# Patient Record
Sex: FEMALE | Race: WHITE | NOT HISPANIC OR LATINO | Employment: UNEMPLOYED | URBAN - NONMETROPOLITAN AREA
[De-identification: names, ages, dates, MRNs, and addresses within clinical notes are randomized per-mention and may not be internally consistent; named-entity substitution may affect disease eponyms.]

---
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
## 2020-05-04 ENCOUNTER — OFFICE VISIT (OUTPATIENT)
Dept: FAMILY MEDICINE | Facility: OTHER | Age: 20
End: 2020-05-04
Attending: PHYSICIAN ASSISTANT
Payer: COMMERCIAL

## 2020-05-04 VITALS
RESPIRATION RATE: 16 BRPM | BODY MASS INDEX: 41.3 KG/M2 | WEIGHT: 224.4 LBS | TEMPERATURE: 98.8 F | HEART RATE: 60 BPM | SYSTOLIC BLOOD PRESSURE: 108 MMHG | DIASTOLIC BLOOD PRESSURE: 60 MMHG | HEIGHT: 62 IN

## 2020-05-04 DIAGNOSIS — H93.11 TINNITUS, RIGHT: ICD-10-CM

## 2020-05-04 PROBLEM — R56.9 CONVULSIONS (H): Status: RESOLVED | Noted: 2018-02-24 | Resolved: 2020-05-04

## 2020-05-04 PROBLEM — E66.9 OBESITY: Status: ACTIVE | Noted: 2020-05-04

## 2020-05-04 PROCEDURE — 99213 OFFICE O/P EST LOW 20 MIN: CPT | Performed by: PHYSICIAN ASSISTANT

## 2020-05-04 ASSESSMENT — MIFFLIN-ST. JEOR: SCORE: 1746.12

## 2020-05-04 ASSESSMENT — PAIN SCALES - GENERAL: PAINLEVEL: NO PAIN (1)

## 2020-05-04 NOTE — PROGRESS NOTES
SUBJECTIVE:  Ritika Daniel is a 19 year old female with a history of obesity BMI 41.04 is here for complaints of a whooshing sound in her right ear since this past November.  Patient denies any trauma, foreign objects or loud noises when symptoms started.  Symptoms are made worse by leaning over and are made better when she presses on the space near the mastoid process.  No history of hypertension.  She denies dizziness, vertigo, facial numbness, pain of the ear or jaw, redness or swelling of the mastoid process or the ear.  No bleeding or discharge from the ear. No headache, vision changes, jaw pain, problems with chewing, swallowing, or breathing.   She denies fever chills or night sweats.  She has not tried any interventions to make her symptoms go away.  Patient states her mom looked in her ear with an otoscope recently and did not find any abnormalities.  Patient has a history of lockjaw but has not had any episodes since she was very young.      Patient Active Problem List    Diagnosis Date Noted     Obesity 2020     Priority: Medium     Tinnitus, right 2020     Priority: Medium       Past Medical History:   Diagnosis Date     Convulsions of      Mom states did see specialist, but no disorder was discovered.     Single live birth     BW 7 lb. 1 oz.,  APGAR 9/9; breast fed       History reviewed. No pertinent surgical history.    No current outpatient medications on file.       Allergies:  No Known Allergies    History reviewed. No pertinent family history.    Social History     Tobacco Use     Smoking status: Passive Smoke Exposure - Never Smoker     Smokeless tobacco: Never Used   Substance Use Topics     Alcohol use: No     Alcohol/week: 0.0 standard drinks     Drug use: Unknown     Types: Other     Comment: Drug use: No       ROS:    As above otherwise ROS is unremarkable.      OBJECTIVE:  /60 (BP Location: Right arm, Patient Position: Sitting, Cuff Size: Adult Large)   Pulse 60    "Temp 98.8  F (37.1  C) (Tympanic)   Resp 16   Ht 1.575 m (5' 2\")   Wt 101.8 kg (224 lb 6.4 oz)   LMP 05/03/2020 (Exact Date)   Breastfeeding No   BMI 41.04 kg/m      EXAM:  General Appearance: Pleasant, alert, appropriate appearance for age. No acute distress  Head: Normal. Normocephalic, atraumatic.  Eyes: PERRL, EOMI  Ears: Normal TM's bilaterally. Normal auditory canals and external ears.   OroPharynx: Dental hygiene adequate. Normal buccal mucosa. Normal pharynx.  Neck: Supple, no masses or nodes, no lymphadenopathy.  No thyromegaly.  Lungs: Normal chest wall and respirations. Clear to auscultation, no wheezes or crackles.  Cardiovascular: Regular rate and rhythm. S1, S2, no murmurs.  Skin: no concerning or new rashes.  Neurologic Exam: CN 2-12 grossly intact.  Normal gait.  Symmetric DTRs, No focal motor or sensory deficits. No tremor.  Psychiatric Exam: Alert and oriented, appropriate affect.    ASSESSEMENT AND PLAN:    1. Tinnitus, right    -No MRI at this point. Did not find significant features suggesting Meniere disease or Acoustic neuroma (schwannoma). No hypertension.   -Recommend she continue monitoring symptoms and report any changes to our clinic as soon as possible.  -Sent activation for My Chart to supplement future communication.       EMA Donahue  Family Medicine  [facility]     This document was prepared using voice generated software.  While every attempt was made for accuracy, grammatical errors may exist.    "

## 2020-05-04 NOTE — NURSING NOTE
"Patient in clinic for R ear problem x 5 months with ability to hear affected. Patient is struggling with it , as it is not getting better. Patient states, \"feels like wind is in my ear.\" Relieved by pushing under/behind earlobe.     Yovana Lui LPN LPN....................  5/4/2020   11:12 AM    Chief Complaint   Patient presents with     Ear Problem     right ear       Medication Reconciliation: complete    Yovana Lui LPN    "

## 2020-12-27 ENCOUNTER — HEALTH MAINTENANCE LETTER (OUTPATIENT)
Age: 20
End: 2020-12-27

## 2021-01-11 LAB
C TRACH DNA SPEC QL PROBE+SIG AMP: NEGATIVE
HBA1C MFR BLD: 5.2 % (ref 4.8–5.6)
HEP C HIM: NORMAL
HIV 1&2 EXT: NORMAL
N GONORRHOEA DNA SPEC QL PROBE+SIG AMP: NEGATIVE
SPECIMEN DESCRIP: NORMAL
SPECIMEN DESCRIPTION: NORMAL

## 2021-01-16 LAB
C TRACH DNA SPEC QL PROBE+SIG AMP: NEGATIVE
N GONORRHOEA DNA SPEC QL PROBE+SIG AMP: NEGATIVE
SPECIMEN DESCRIP: NORMAL
SPECIMEN DESCRIPTION: NORMAL

## 2021-02-18 ENCOUNTER — TRANSFERRED RECORDS (OUTPATIENT)
Dept: HEALTH INFORMATION MANAGEMENT | Facility: OTHER | Age: 21
End: 2021-02-18

## 2021-02-23 ENCOUNTER — OFFICE VISIT (OUTPATIENT)
Dept: OBGYN | Facility: OTHER | Age: 21
End: 2021-02-23
Attending: OBSTETRICS & GYNECOLOGY
Payer: COMMERCIAL

## 2021-02-23 VITALS
BODY MASS INDEX: 44.01 KG/M2 | WEIGHT: 240.6 LBS | OXYGEN SATURATION: 98 % | HEART RATE: 75 BPM | SYSTOLIC BLOOD PRESSURE: 130 MMHG | DIASTOLIC BLOOD PRESSURE: 82 MMHG

## 2021-02-23 DIAGNOSIS — O03.9 MISCARRIAGE: Primary | ICD-10-CM

## 2021-02-23 PROBLEM — O26.90: Status: ACTIVE | Noted: 2021-02-18

## 2021-02-23 PROBLEM — Z34.91 PRENATAL CARE, FIRST TRIMESTER: Status: ACTIVE | Noted: 2021-01-11

## 2021-02-23 PROBLEM — R03.0 ELEVATED BLOOD PRESSURE READING WITHOUT DIAGNOSIS OF HYPERTENSION: Status: ACTIVE | Noted: 2021-01-16

## 2021-02-23 PROBLEM — Z00.00 ENCOUNTER FOR GENERAL ADULT MEDICAL EXAMINATION WITHOUT ABNORMAL FINDINGS: Status: ACTIVE | Noted: 2021-01-11

## 2021-02-23 PROBLEM — E66.01 MORBID OBESITY (H): Status: ACTIVE | Noted: 2021-01-11

## 2021-02-23 PROBLEM — Z33.1 PREGNANT STATE, INCIDENTAL: Status: ACTIVE | Noted: 2020-12-30

## 2021-02-23 PROBLEM — N89.8 VAGINAL DISCHARGE: Status: ACTIVE | Noted: 2021-01-13

## 2021-02-23 LAB
LABORATORY COMMENT REPORT: NORMAL
SARS-COV-2 RNA RESP QL NAA+PROBE: NEGATIVE
SARS-COV-2 RNA RESP QL NAA+PROBE: NORMAL
SPECIMEN SOURCE: NORMAL
SPECIMEN SOURCE: NORMAL

## 2021-02-23 PROCEDURE — 99204 OFFICE O/P NEW MOD 45 MIN: CPT | Performed by: OBSTETRICS & GYNECOLOGY

## 2021-02-23 PROCEDURE — U0005 INFEC AGEN DETEC AMPLI PROBE: HCPCS | Mod: ZL | Performed by: OBSTETRICS & GYNECOLOGY

## 2021-02-23 PROCEDURE — U0003 INFECTIOUS AGENT DETECTION BY NUCLEIC ACID (DNA OR RNA); SEVERE ACUTE RESPIRATORY SYNDROME CORONAVIRUS 2 (SARS-COV-2) (CORONAVIRUS DISEASE [COVID-19]), AMPLIFIED PROBE TECHNIQUE, MAKING USE OF HIGH THROUGHPUT TECHNOLOGIES AS DESCRIBED BY CMS-2020-01-R: HCPCS | Mod: ZL | Performed by: OBSTETRICS & GYNECOLOGY

## 2021-02-23 PROCEDURE — C9803 HOPD COVID-19 SPEC COLLECT: HCPCS

## 2021-02-23 RX ORDER — DOXYCYCLINE 100 MG/1
100 CAPSULE ORAL ONCE
Status: CANCELLED | OUTPATIENT
Start: 2021-02-23 | End: 2021-02-23

## 2021-02-23 ASSESSMENT — PAIN SCALES - GENERAL: PAINLEVEL: NO PAIN (0)

## 2021-02-23 NOTE — H&P (VIEW-ONLY)
Gynecology Visit- Preop H&P    CC: missed     HPI:    Ritika Daniel is a 20 year old G1 here for the above concern. She was seen at a clinic in [place], which is where she lives with her boyfriend. Came to [place] because this is where she is from and wanted to be closer to her mom for the procedure. They were monitoring HCG levels, which had been declining. She had an ultrasound that confirmed miscarriage, not sure how far along she was measuring. Was supposed to be 12 weeks by LMP. The last few days, she has been having light bleeding and cramping. Some clots but no tissue.  She had discussed all her options with her primary OB provider and decided she wants a D&C.     OBHx  OB History    Para Term  AB Living   1 0 0 0 0 0   SAB TAB Ectopic Multiple Live Births   0 0 0 0 0      # Outcome Date GA Lbr Jl/2nd Weight Sex Delivery Anes PTL Lv   1 Current                PMHx:   Past Medical History:   Diagnosis Date     Convulsions of      Mom states did see specialist, but no disorder was discovered.     Single live birth     BW 7 lb. 1 oz.,  APGAR 9/9; breast fed      PSHx:   Past Surgical History:   Procedure Laterality Date     NO HISTORY OF SURGERY        Meds:    No current outpatient medications on file.     No current facility-administered medications for this visit.      Allergies:   No Known Allergies    SocHx:   Social History     Tobacco Use     Smoking status: Passive Smoke Exposure - Never Smoker     Smokeless tobacco: Never Used   Substance Use Topics     Alcohol use: No     Alcohol/week: 0.0 standard drinks     Drug use: Unknown     Types: Other     Comment: Drug use: No     Lives with her boyfriend and his parents in [place]. Graduated last year, looking for a job.     FamHx:   Family History   Problem Relation Age of Onset     Diabetes Maternal Grandmother         ROS: 10-Point ROS negative except as noted in HPI    Physical Exam  /82 (BP Location: Right arm, Patient  Position: Sitting, Cuff Size: Adult Large)   Pulse 75   Wt 109.1 kg (240 lb 9.6 oz)   LMP 2020 (Exact Date)   SpO2 98%   BMI 44.01 kg/m    Gen: Well-appearing, NAD  Resp: CTAB  CV: RRR, no m/r/g  Psych: appropriate mood and affect    HC21: 64597  2/15/21: 52602  21: 9376    Progesterone  21: 6.3    Blood type: A+, antibody negative    Assessment/Plan  Ritika Daniel is a 20 year old G1 female here for missed . Labs available in Care Everywhere summary from new OB visit. However, ultrasound images or report not available. Clinic contacted and patient already signed IRINEO, will try to obtain today or else will get ultrasound tomorrow. Discussed R/B/A for suction D&C, including risk of bleeding, infection, injury to surrounding organs. Consent signed. Scheduled for 21. COVID testing today.     Total time spent 45 minutes     Marilynn Guzman MD  OB/GYN  2021 12:40 PM

## 2021-02-23 NOTE — PROGRESS NOTES
Gynecology Visit- Preop H&P    CC: missed     HPI:    Ritika Daniel is a 20 year old G1 here for the above concern. She was seen at a clinic in [place], which is where she lives with her boyfriend. Came to [place] because this is where she is from and wanted to be closer to her mom for the procedure. They were monitoring HCG levels, which had been declining. She had an ultrasound that confirmed miscarriage, not sure how far along she was measuring. Was supposed to be 12 weeks by LMP. The last few days, she has been having light bleeding and cramping. Some clots but no tissue.  She had discussed all her options with her primary OB provider and decided she wants a D&C.     OBHx  OB History    Para Term  AB Living   1 0 0 0 0 0   SAB TAB Ectopic Multiple Live Births   0 0 0 0 0      # Outcome Date GA Lbr Jl/2nd Weight Sex Delivery Anes PTL Lv   1 Current                PMHx:   Past Medical History:   Diagnosis Date     Convulsions of      Mom states did see specialist, but no disorder was discovered.     Single live birth     BW 7 lb. 1 oz.,  APGAR 9/9; breast fed      PSHx:   Past Surgical History:   Procedure Laterality Date     NO HISTORY OF SURGERY        Meds:    No current outpatient medications on file.     No current facility-administered medications for this visit.      Allergies:   No Known Allergies    SocHx:   Social History     Tobacco Use     Smoking status: Passive Smoke Exposure - Never Smoker     Smokeless tobacco: Never Used   Substance Use Topics     Alcohol use: No     Alcohol/week: 0.0 standard drinks     Drug use: Unknown     Types: Other     Comment: Drug use: No     Lives with her boyfriend and his parents in [place]. Graduated last year, looking for a job.     FamHx:   Family History   Problem Relation Age of Onset     Diabetes Maternal Grandmother         ROS: 10-Point ROS negative except as noted in HPI    Physical Exam  /82 (BP Location: Right arm, Patient  Position: Sitting, Cuff Size: Adult Large)   Pulse 75   Wt 109.1 kg (240 lb 9.6 oz)   LMP 2020 (Exact Date)   SpO2 98%   BMI 44.01 kg/m    Gen: Well-appearing, NAD  Resp: CTAB  CV: RRR, no m/r/g  Psych: appropriate mood and affect    HC21: 14539  2/15/21: 03115  21: 9376    Progesterone  21: 6.3    Blood type: A+, antibody negative    Assessment/Plan  Ritika Daniel is a 20 year old G1 female here for missed . Labs available in Care Everywhere summary from new OB visit. However, ultrasound images or report not available. Clinic contacted and patient already signed IRINEO, will try to obtain today or else will get ultrasound tomorrow. Discussed R/B/A for suction D&C, including risk of bleeding, infection, injury to surrounding organs. Consent signed. Scheduled for 21. COVID testing today.     Total time spent 45 minutes     Marilynn Guzman MD  OB/GYN  2021 12:40 PM

## 2021-02-23 NOTE — PROGRESS NOTES
"Date of Surgery: 2/25/2021  Type of Surgery: Suction D & C  Surgeon: Dr. Marilynn Guzman MD    Patient's consents were signed and appropriate appointments were scheduled by the [facility] . Patient was given surgical folder which includes pre-operative bathing instructions related to the two packets of Hibiclens surgical prep provided. Surgical forms were copied and kept for informative purposes. Originals were delivered to Day-surgery. Patient denies questions at this time.        STOP BANG    Fever/Chills or other infectious symptoms in past month? n  >10 pound weight loss in the past 2 months? n  Health Care Directive on file? n  History of blood transfusions? n  Td up to date? y  History of VRE/MRSA? n      Obstructive Sleep Apnea screening    Preoperative Evaluation: Obstructive Sleep Apnea screening    S: Snore -  Do you snore loudly? (louder than talking or loud enough to be heard through closed doors)n  T: Tired - Do you often feel tired, fatigued, or sleepy during the daytime?n  O: Observed - Has anyone ever observed you stop breathing during your sleep?n  P: Pressure - Do you have or are you being treated for high blood pressure?n  B: BMI - BMI greater than 35kg/m2?y  A: Age - Age over 50 years old?n  N: Neck - Neck circumference greater than 40 cm?n  G: Gender - Gender: Male?n    Total number of \"YES\" responses:  1    Scoring: Low risk of MARS 0-2  At Risk of MARS: >3 High Risk of MARS: 5-8      Total yes answers in MARS section:    Low risk 0-2  At risk 3-4  High risk 5-8    Romi Guzman RN............. 2/23/2021 12:32 PM   "

## 2021-02-24 ENCOUNTER — ANESTHESIA EVENT (OUTPATIENT)
Dept: SURGERY | Facility: OTHER | Age: 21
End: 2021-02-24
Payer: COMMERCIAL

## 2021-02-24 ENCOUNTER — HOSPITAL ENCOUNTER (EMERGENCY)
Facility: OTHER | Age: 21
Discharge: HOME | End: 2021-02-24
Attending: EMERGENCY MEDICINE | Admitting: EMERGENCY MEDICINE
Payer: COMMERCIAL

## 2021-02-24 ENCOUNTER — APPOINTMENT (OUTPATIENT)
Dept: ULTRASOUND IMAGING | Facility: OTHER | Age: 21
End: 2021-02-24
Attending: EMERGENCY MEDICINE
Payer: COMMERCIAL

## 2021-02-24 VITALS
HEART RATE: 82 BPM | TEMPERATURE: 98.2 F | SYSTOLIC BLOOD PRESSURE: 123 MMHG | BODY MASS INDEX: 44.16 KG/M2 | HEIGHT: 62 IN | WEIGHT: 240 LBS | DIASTOLIC BLOOD PRESSURE: 70 MMHG | RESPIRATION RATE: 18 BRPM | OXYGEN SATURATION: 98 %

## 2021-02-24 DIAGNOSIS — O02.1 MISSED ABORTION: ICD-10-CM

## 2021-02-24 LAB
ABO + RH BLD: NORMAL
ABO + RH BLD: NORMAL
BASOPHILS # BLD AUTO: 0 10E9/L (ref 0–0.2)
BASOPHILS NFR BLD AUTO: 0.2 %
BLD GP AB SCN SERPL QL: NORMAL
BLOOD BANK CMNT PATIENT-IMP: NORMAL
D DIMER PPP FEU-MCNC: 0.4 UG/ML FEU (ref 0–0.5)
DIFFERENTIAL METHOD BLD: ABNORMAL
EOSINOPHIL # BLD AUTO: 0.1 10E9/L (ref 0–0.7)
EOSINOPHIL NFR BLD AUTO: 0.9 %
ERYTHROCYTE [DISTWIDTH] IN BLOOD BY AUTOMATED COUNT: 14 % (ref 10–15)
HCT VFR BLD AUTO: 35.5 % (ref 35–47)
HGB BLD-MCNC: 11.9 G/DL (ref 11.7–15.7)
IMM GRANULOCYTES # BLD: 0 10E9/L (ref 0–0.4)
IMM GRANULOCYTES NFR BLD: 0.3 %
LYMPHOCYTES # BLD AUTO: 2.5 10E9/L (ref 0.8–5.3)
LYMPHOCYTES NFR BLD AUTO: 19.5 %
MCH RBC QN AUTO: 27 PG (ref 26.5–33)
MCHC RBC AUTO-ENTMCNC: 33.5 G/DL (ref 31.5–36.5)
MCV RBC AUTO: 81 FL (ref 78–100)
MONOCYTES # BLD AUTO: 0.8 10E9/L (ref 0–1.3)
MONOCYTES NFR BLD AUTO: 6.4 %
NEUTROPHILS # BLD AUTO: 9.1 10E9/L (ref 1.6–8.3)
NEUTROPHILS NFR BLD AUTO: 72.7 %
PLATELET # BLD AUTO: 384 10E9/L (ref 150–450)
RBC # BLD AUTO: 4.41 10E12/L (ref 3.8–5.2)
SPECIMEN EXP DATE BLD: NORMAL
WBC # BLD AUTO: 12.6 10E9/L (ref 4–11)

## 2021-02-24 PROCEDURE — 85379 FIBRIN DEGRADATION QUANT: CPT | Performed by: EMERGENCY MEDICINE

## 2021-02-24 PROCEDURE — 96374 THER/PROPH/DIAG INJ IV PUSH: CPT | Performed by: EMERGENCY MEDICINE

## 2021-02-24 PROCEDURE — 85025 COMPLETE CBC W/AUTO DIFF WBC: CPT | Performed by: EMERGENCY MEDICINE

## 2021-02-24 PROCEDURE — 96375 TX/PRO/DX INJ NEW DRUG ADDON: CPT | Performed by: EMERGENCY MEDICINE

## 2021-02-24 PROCEDURE — 99284 EMERGENCY DEPT VISIT MOD MDM: CPT | Mod: 25 | Performed by: EMERGENCY MEDICINE

## 2021-02-24 PROCEDURE — 86900 BLOOD TYPING SEROLOGIC ABO: CPT | Performed by: EMERGENCY MEDICINE

## 2021-02-24 PROCEDURE — 36415 COLL VENOUS BLD VENIPUNCTURE: CPT | Performed by: EMERGENCY MEDICINE

## 2021-02-24 PROCEDURE — 86850 RBC ANTIBODY SCREEN: CPT | Performed by: EMERGENCY MEDICINE

## 2021-02-24 PROCEDURE — 99283 EMERGENCY DEPT VISIT LOW MDM: CPT | Performed by: EMERGENCY MEDICINE

## 2021-02-24 PROCEDURE — 258N000003 HC RX IP 258 OP 636: Performed by: EMERGENCY MEDICINE

## 2021-02-24 PROCEDURE — 96361 HYDRATE IV INFUSION ADD-ON: CPT | Performed by: EMERGENCY MEDICINE

## 2021-02-24 PROCEDURE — 86901 BLOOD TYPING SEROLOGIC RH(D): CPT | Performed by: EMERGENCY MEDICINE

## 2021-02-24 PROCEDURE — 76830 TRANSVAGINAL US NON-OB: CPT

## 2021-02-24 PROCEDURE — 250N000011 HC RX IP 250 OP 636: Performed by: EMERGENCY MEDICINE

## 2021-02-24 RX ORDER — SODIUM CHLORIDE 9 MG/ML
INJECTION, SOLUTION INTRAVENOUS CONTINUOUS
Status: DISCONTINUED | OUTPATIENT
Start: 2021-02-24 | End: 2021-02-24 | Stop reason: HOSPADM

## 2021-02-24 RX ORDER — HYDROMORPHONE HYDROCHLORIDE 1 MG/ML
0.5 INJECTION, SOLUTION INTRAMUSCULAR; INTRAVENOUS; SUBCUTANEOUS ONCE
Status: COMPLETED | OUTPATIENT
Start: 2021-02-24 | End: 2021-02-24

## 2021-02-24 RX ORDER — KETOROLAC TROMETHAMINE 15 MG/ML
15 INJECTION, SOLUTION INTRAMUSCULAR; INTRAVENOUS ONCE
Status: COMPLETED | OUTPATIENT
Start: 2021-02-24 | End: 2021-02-24

## 2021-02-24 RX ADMIN — SODIUM CHLORIDE 1000 ML: 9 INJECTION, SOLUTION INTRAVENOUS at 19:40

## 2021-02-24 RX ADMIN — HYDROMORPHONE HYDROCHLORIDE 0.5 MG: 1 INJECTION, SOLUTION INTRAMUSCULAR; INTRAVENOUS; SUBCUTANEOUS at 19:38

## 2021-02-24 RX ADMIN — KETOROLAC TROMETHAMINE 15 MG: 15 INJECTION, SOLUTION INTRAMUSCULAR; INTRAVENOUS at 19:42

## 2021-02-24 ASSESSMENT — MIFFLIN-ST. JEOR: SCORE: 1811.88

## 2021-02-25 ENCOUNTER — ANESTHESIA (OUTPATIENT)
Dept: SURGERY | Facility: OTHER | Age: 21
End: 2021-02-25
Payer: COMMERCIAL

## 2021-02-25 ENCOUNTER — HOSPITAL ENCOUNTER (OUTPATIENT)
Facility: OTHER | Age: 21
Discharge: HOME | End: 2021-02-25
Attending: OBSTETRICS & GYNECOLOGY | Admitting: OBSTETRICS & GYNECOLOGY
Payer: COMMERCIAL

## 2021-02-25 ENCOUNTER — HOSPITAL ENCOUNTER (EMERGENCY)
Facility: OTHER | Age: 21
Discharge: HOME | End: 2021-02-25
Attending: EMERGENCY MEDICINE | Admitting: EMERGENCY MEDICINE
Payer: COMMERCIAL

## 2021-02-25 VITALS
HEIGHT: 62 IN | OXYGEN SATURATION: 98 % | SYSTOLIC BLOOD PRESSURE: 134 MMHG | WEIGHT: 240 LBS | DIASTOLIC BLOOD PRESSURE: 85 MMHG | BODY MASS INDEX: 44.16 KG/M2 | TEMPERATURE: 99.6 F | RESPIRATION RATE: 20 BRPM | HEART RATE: 99 BPM

## 2021-02-25 VITALS
HEART RATE: 76 BPM | WEIGHT: 240 LBS | SYSTOLIC BLOOD PRESSURE: 97 MMHG | OXYGEN SATURATION: 98 % | RESPIRATION RATE: 18 BRPM | DIASTOLIC BLOOD PRESSURE: 67 MMHG | HEIGHT: 62 IN | TEMPERATURE: 97.6 F | BODY MASS INDEX: 44.16 KG/M2

## 2021-02-25 DIAGNOSIS — O03.9 MISCARRIAGE: ICD-10-CM

## 2021-02-25 DIAGNOSIS — G89.18 ACUTE POST-OPERATIVE PAIN: ICD-10-CM

## 2021-02-25 LAB
ALBUMIN UR-MCNC: ABNORMAL MG/DL
ANION GAP SERPL CALCULATED.3IONS-SCNC: 10 MMOL/L (ref 3–14)
APPEARANCE UR: ABNORMAL
BASOPHILS # BLD AUTO: 0 10E9/L (ref 0–0.2)
BASOPHILS NFR BLD AUTO: 0.1 %
BILIRUB UR QL STRIP: ABNORMAL
BUN SERPL-MCNC: 9 MG/DL (ref 7–25)
CALCIUM SERPL-MCNC: 9.3 MG/DL (ref 8.6–10.3)
CHLORIDE SERPL-SCNC: 106 MMOL/L (ref 98–107)
CO2 SERPL-SCNC: 19 MMOL/L (ref 21–31)
COLOR UR AUTO: ABNORMAL
CREAT SERPL-MCNC: 0.58 MG/DL (ref 0.6–1.2)
DIFFERENTIAL METHOD BLD: ABNORMAL
EOSINOPHIL # BLD AUTO: 0 10E9/L (ref 0–0.7)
EOSINOPHIL NFR BLD AUTO: 0 %
ERYTHROCYTE [DISTWIDTH] IN BLOOD BY AUTOMATED COUNT: 13.9 % (ref 10–15)
GFR SERPL CREATININE-BSD FRML MDRD: >90 ML/MIN/{1.73_M2}
GLUCOSE SERPL-MCNC: 136 MG/DL (ref 70–105)
GLUCOSE UR STRIP-MCNC: ABNORMAL MG/DL
HCT VFR BLD AUTO: 33.6 % (ref 35–47)
HGB BLD-MCNC: 11.3 G/DL (ref 11.7–15.7)
HGB UR QL STRIP: ABNORMAL
IMM GRANULOCYTES # BLD: 0.1 10E9/L (ref 0–0.4)
IMM GRANULOCYTES NFR BLD: 0.6 %
KETONES UR STRIP-MCNC: ABNORMAL MG/DL
LACTATE BLD-SCNC: 1.5 MMOL/L (ref 0.7–2)
LEUKOCYTE ESTERASE UR QL STRIP: ABNORMAL
LYMPHOCYTES # BLD AUTO: 1.3 10E9/L (ref 0.8–5.3)
LYMPHOCYTES NFR BLD AUTO: 7.2 %
MCH RBC QN AUTO: 26.9 PG (ref 26.5–33)
MCHC RBC AUTO-ENTMCNC: 33.6 G/DL (ref 31.5–36.5)
MCV RBC AUTO: 80 FL (ref 78–100)
MONOCYTES # BLD AUTO: 0.8 10E9/L (ref 0–1.3)
MONOCYTES NFR BLD AUTO: 4.5 %
NEUTROPHILS # BLD AUTO: 15.3 10E9/L (ref 1.6–8.3)
NEUTROPHILS NFR BLD AUTO: 87.6 %
NITRATE UR QL: ABNORMAL
PH UR STRIP: ABNORMAL PH (ref 5–7)
PLATELET # BLD AUTO: 398 10E9/L (ref 150–450)
POTASSIUM SERPL-SCNC: 3.9 MMOL/L (ref 3.5–5.1)
RBC # BLD AUTO: 4.2 10E12/L (ref 3.8–5.2)
RBC #/AREA URNS AUTO: >182 /HPF (ref 0–2)
SODIUM SERPL-SCNC: 135 MMOL/L (ref 134–144)
SOURCE: ABNORMAL
SP GR UR STRIP: ABNORMAL (ref 1–1.03)
UROBILINOGEN UR STRIP-MCNC: ABNORMAL MG/DL (ref 0–2)
WBC # BLD AUTO: 17.4 10E9/L (ref 4–11)
WBC #/AREA URNS AUTO: >182 /HPF (ref 0–5)

## 2021-02-25 PROCEDURE — 88305 TISSUE EXAM BY PATHOLOGIST: CPT

## 2021-02-25 PROCEDURE — 59820 CARE OF MISCARRIAGE: CPT | Performed by: NURSE ANESTHETIST, CERTIFIED REGISTERED

## 2021-02-25 PROCEDURE — 83605 ASSAY OF LACTIC ACID: CPT | Performed by: EMERGENCY MEDICINE

## 2021-02-25 PROCEDURE — 360N000075 HC SURGERY LEVEL 2, PER MIN: Performed by: OBSTETRICS & GYNECOLOGY

## 2021-02-25 PROCEDURE — 250N000013 HC RX MED GY IP 250 OP 250 PS 637: Performed by: OBSTETRICS & GYNECOLOGY

## 2021-02-25 PROCEDURE — 99283 EMERGENCY DEPT VISIT LOW MDM: CPT | Performed by: EMERGENCY MEDICINE

## 2021-02-25 PROCEDURE — 250N000013 HC RX MED GY IP 250 OP 250 PS 637: Performed by: EMERGENCY MEDICINE

## 2021-02-25 PROCEDURE — 88182 CELL MARKER STUDY: CPT

## 2021-02-25 PROCEDURE — 250N000011 HC RX IP 250 OP 636: Performed by: NURSE ANESTHETIST, CERTIFIED REGISTERED

## 2021-02-25 PROCEDURE — 99284 EMERGENCY DEPT VISIT MOD MDM: CPT | Mod: 25 | Performed by: EMERGENCY MEDICINE

## 2021-02-25 PROCEDURE — 81001 URINALYSIS AUTO W/SCOPE: CPT | Performed by: EMERGENCY MEDICINE

## 2021-02-25 PROCEDURE — 250N000011 HC RX IP 250 OP 636: Performed by: OBSTETRICS & GYNECOLOGY

## 2021-02-25 PROCEDURE — 999N000141 HC STATISTIC PRE-PROCEDURE NURSING ASSESSMENT: Performed by: OBSTETRICS & GYNECOLOGY

## 2021-02-25 PROCEDURE — 258N000003 HC RX IP 258 OP 636: Performed by: NURSE ANESTHETIST, CERTIFIED REGISTERED

## 2021-02-25 PROCEDURE — 80048 BASIC METABOLIC PNL TOTAL CA: CPT | Performed by: EMERGENCY MEDICINE

## 2021-02-25 PROCEDURE — 272N000001 HC OR GENERAL SUPPLY STERILE: Performed by: OBSTETRICS & GYNECOLOGY

## 2021-02-25 PROCEDURE — 250N000009 HC RX 250: Performed by: OBSTETRICS & GYNECOLOGY

## 2021-02-25 PROCEDURE — 258N000003 HC RX IP 258 OP 636: Performed by: EMERGENCY MEDICINE

## 2021-02-25 PROCEDURE — 96375 TX/PRO/DX INJ NEW DRUG ADDON: CPT | Performed by: EMERGENCY MEDICINE

## 2021-02-25 PROCEDURE — 250N000011 HC RX IP 250 OP 636: Performed by: EMERGENCY MEDICINE

## 2021-02-25 PROCEDURE — 250N000009 HC RX 250: Performed by: NURSE ANESTHETIST, CERTIFIED REGISTERED

## 2021-02-25 PROCEDURE — 59820 CARE OF MISCARRIAGE: CPT | Performed by: OBSTETRICS & GYNECOLOGY

## 2021-02-25 PROCEDURE — 710N000012 HC RECOVERY PHASE 2, PER MINUTE: Performed by: OBSTETRICS & GYNECOLOGY

## 2021-02-25 PROCEDURE — 370N000017 HC ANESTHESIA TECHNICAL FEE, PER MIN: Performed by: OBSTETRICS & GYNECOLOGY

## 2021-02-25 PROCEDURE — 96374 THER/PROPH/DIAG INJ IV PUSH: CPT | Performed by: EMERGENCY MEDICINE

## 2021-02-25 PROCEDURE — 85025 COMPLETE CBC W/AUTO DIFF WBC: CPT | Performed by: EMERGENCY MEDICINE

## 2021-02-25 PROCEDURE — 88342 IMHCHEM/IMCYTCHM 1ST ANTB: CPT

## 2021-02-25 PROCEDURE — 87086 URINE CULTURE/COLONY COUNT: CPT | Performed by: EMERGENCY MEDICINE

## 2021-02-25 RX ORDER — PROPOFOL 10 MG/ML
INJECTION, EMULSION INTRAVENOUS PRN
Status: DISCONTINUED | OUTPATIENT
Start: 2021-02-25 | End: 2021-02-25

## 2021-02-25 RX ORDER — FENTANYL CITRATE 50 UG/ML
25-50 INJECTION, SOLUTION INTRAMUSCULAR; INTRAVENOUS
Status: DISCONTINUED | OUTPATIENT
Start: 2021-02-25 | End: 2021-02-25 | Stop reason: HOSPADM

## 2021-02-25 RX ORDER — IBUPROFEN 200 MG
800 TABLET ORAL ONCE
Status: CANCELLED | OUTPATIENT
Start: 2021-02-25 | End: 2021-02-25

## 2021-02-25 RX ORDER — NALOXONE HYDROCHLORIDE 0.4 MG/ML
0.2 INJECTION, SOLUTION INTRAMUSCULAR; INTRAVENOUS; SUBCUTANEOUS
Status: DISCONTINUED | OUTPATIENT
Start: 2021-02-25 | End: 2021-02-25 | Stop reason: HOSPADM

## 2021-02-25 RX ORDER — NALOXONE HYDROCHLORIDE 0.4 MG/ML
0.4 INJECTION, SOLUTION INTRAMUSCULAR; INTRAVENOUS; SUBCUTANEOUS
Status: DISCONTINUED | OUTPATIENT
Start: 2021-02-25 | End: 2021-02-25 | Stop reason: HOSPADM

## 2021-02-25 RX ORDER — LIDOCAINE HYDROCHLORIDE 20 MG/ML
INJECTION, SOLUTION INFILTRATION; PERINEURAL PRN
Status: DISCONTINUED | OUTPATIENT
Start: 2021-02-25 | End: 2021-02-25

## 2021-02-25 RX ORDER — HYDROMORPHONE HYDROCHLORIDE 1 MG/ML
.3-.5 INJECTION, SOLUTION INTRAMUSCULAR; INTRAVENOUS; SUBCUTANEOUS EVERY 10 MIN PRN
Status: DISCONTINUED | OUTPATIENT
Start: 2021-02-25 | End: 2021-02-25 | Stop reason: HOSPADM

## 2021-02-25 RX ORDER — KETOROLAC TROMETHAMINE 30 MG/ML
INJECTION, SOLUTION INTRAMUSCULAR; INTRAVENOUS PRN
Status: DISCONTINUED | OUTPATIENT
Start: 2021-02-25 | End: 2021-02-25

## 2021-02-25 RX ORDER — MEPERIDINE HYDROCHLORIDE 50 MG/ML
12.5 INJECTION INTRAMUSCULAR; INTRAVENOUS; SUBCUTANEOUS
Status: DISCONTINUED | OUTPATIENT
Start: 2021-02-25 | End: 2021-02-25 | Stop reason: HOSPADM

## 2021-02-25 RX ORDER — SODIUM CHLORIDE, SODIUM LACTATE, POTASSIUM CHLORIDE, CALCIUM CHLORIDE 600; 310; 30; 20 MG/100ML; MG/100ML; MG/100ML; MG/100ML
INJECTION, SOLUTION INTRAVENOUS CONTINUOUS
Status: DISCONTINUED | OUTPATIENT
Start: 2021-02-25 | End: 2021-02-25 | Stop reason: HOSPADM

## 2021-02-25 RX ORDER — ACETAMINOPHEN 325 MG/1
975 TABLET ORAL ONCE
Status: CANCELLED | OUTPATIENT
Start: 2021-02-25 | End: 2021-02-25

## 2021-02-25 RX ORDER — DOXYCYCLINE 100 MG/1
100 CAPSULE ORAL ONCE
Status: COMPLETED | OUTPATIENT
Start: 2021-02-25 | End: 2021-02-25

## 2021-02-25 RX ORDER — SODIUM CHLORIDE 9 MG/ML
INJECTION, SOLUTION INTRAVENOUS CONTINUOUS
Status: DISCONTINUED | OUTPATIENT
Start: 2021-02-25 | End: 2021-02-26 | Stop reason: HOSPADM

## 2021-02-25 RX ORDER — DEXAMETHASONE SODIUM PHOSPHATE 4 MG/ML
INJECTION, SOLUTION INTRA-ARTICULAR; INTRALESIONAL; INTRAMUSCULAR; INTRAVENOUS; SOFT TISSUE PRN
Status: DISCONTINUED | OUTPATIENT
Start: 2021-02-25 | End: 2021-02-25

## 2021-02-25 RX ORDER — IBUPROFEN 200 MG
600 TABLET ORAL EVERY 8 HOURS PRN
Qty: 60 TABLET | Refills: 0 | COMMUNITY
Start: 2021-02-25

## 2021-02-25 RX ORDER — KETOROLAC TROMETHAMINE 15 MG/ML
15 INJECTION, SOLUTION INTRAMUSCULAR; INTRAVENOUS ONCE
Status: COMPLETED | OUTPATIENT
Start: 2021-02-25 | End: 2021-02-25

## 2021-02-25 RX ORDER — ONDANSETRON 2 MG/ML
4 INJECTION INTRAMUSCULAR; INTRAVENOUS EVERY 30 MIN PRN
Status: DISCONTINUED | OUTPATIENT
Start: 2021-02-25 | End: 2021-02-25 | Stop reason: HOSPADM

## 2021-02-25 RX ORDER — KETAMINE HYDROCHLORIDE 10 MG/ML
INJECTION INTRAMUSCULAR; INTRAVENOUS PRN
Status: DISCONTINUED | OUTPATIENT
Start: 2021-02-25 | End: 2021-02-25

## 2021-02-25 RX ORDER — ONDANSETRON 2 MG/ML
4 INJECTION INTRAMUSCULAR; INTRAVENOUS EVERY 30 MIN PRN
Status: DISCONTINUED | OUTPATIENT
Start: 2021-02-25 | End: 2021-02-26 | Stop reason: HOSPADM

## 2021-02-25 RX ORDER — PROPOFOL 10 MG/ML
INJECTION, EMULSION INTRAVENOUS CONTINUOUS PRN
Status: DISCONTINUED | OUTPATIENT
Start: 2021-02-25 | End: 2021-02-25

## 2021-02-25 RX ORDER — OXYCODONE HYDROCHLORIDE 5 MG/1
5 CAPSULE ORAL EVERY 6 HOURS PRN
Qty: 4 CAPSULE | Refills: 0 | Status: SHIPPED | OUTPATIENT
Start: 2021-02-25

## 2021-02-25 RX ORDER — BUPIVACAINE HYDROCHLORIDE 2.5 MG/ML
INJECTION, SOLUTION INFILTRATION; PERINEURAL PRN
Status: DISCONTINUED | OUTPATIENT
Start: 2021-02-25 | End: 2021-02-25 | Stop reason: HOSPADM

## 2021-02-25 RX ORDER — ONDANSETRON 2 MG/ML
INJECTION INTRAMUSCULAR; INTRAVENOUS PRN
Status: DISCONTINUED | OUTPATIENT
Start: 2021-02-25 | End: 2021-02-25

## 2021-02-25 RX ORDER — HYDROMORPHONE HYDROCHLORIDE 1 MG/ML
0.5 INJECTION, SOLUTION INTRAMUSCULAR; INTRAVENOUS; SUBCUTANEOUS
Status: DISCONTINUED | OUTPATIENT
Start: 2021-02-25 | End: 2021-02-26 | Stop reason: HOSPADM

## 2021-02-25 RX ORDER — OXYCODONE HYDROCHLORIDE 5 MG/1
5 TABLET ORAL ONCE
Status: COMPLETED | OUTPATIENT
Start: 2021-02-25 | End: 2021-02-25

## 2021-02-25 RX ORDER — ONDANSETRON 4 MG/1
4 TABLET, ORALLY DISINTEGRATING ORAL EVERY 30 MIN PRN
Status: DISCONTINUED | OUTPATIENT
Start: 2021-02-25 | End: 2021-02-25 | Stop reason: HOSPADM

## 2021-02-25 RX ADMIN — HYDROMORPHONE HYDROCHLORIDE 0.5 MG: 1 INJECTION, SOLUTION INTRAMUSCULAR; INTRAVENOUS; SUBCUTANEOUS at 20:26

## 2021-02-25 RX ADMIN — LIDOCAINE HYDROCHLORIDE 40 MG: 20 INJECTION, SOLUTION INFILTRATION; PERINEURAL at 09:09

## 2021-02-25 RX ADMIN — KETOROLAC TROMETHAMINE 30 MG: 30 INJECTION, SOLUTION INTRAMUSCULAR at 09:12

## 2021-02-25 RX ADMIN — DOXYCYCLINE 100 MG: 100 CAPSULE ORAL at 08:25

## 2021-02-25 RX ADMIN — SODIUM CHLORIDE: 9 INJECTION, SOLUTION INTRAVENOUS at 20:32

## 2021-02-25 RX ADMIN — OXYCODONE HYDROCHLORIDE 5 MG: 5 TABLET ORAL at 22:08

## 2021-02-25 RX ADMIN — MIDAZOLAM 2 MG: 1 INJECTION INTRAMUSCULAR; INTRAVENOUS at 09:09

## 2021-02-25 RX ADMIN — DEXAMETHASONE SODIUM PHOSPHATE 4 MG: 4 INJECTION, SOLUTION INTRA-ARTICULAR; INTRALESIONAL; INTRAMUSCULAR; INTRAVENOUS; SOFT TISSUE at 09:09

## 2021-02-25 RX ADMIN — PROPOFOL 120 MCG/KG/MIN: 10 INJECTION, EMULSION INTRAVENOUS at 09:09

## 2021-02-25 RX ADMIN — ONDANSETRON 4 MG: 2 INJECTION INTRAMUSCULAR; INTRAVENOUS at 09:09

## 2021-02-25 RX ADMIN — SODIUM CHLORIDE, POTASSIUM CHLORIDE, SODIUM LACTATE AND CALCIUM CHLORIDE: 600; 310; 30; 20 INJECTION, SOLUTION INTRAVENOUS at 08:25

## 2021-02-25 RX ADMIN — KETOROLAC TROMETHAMINE 15 MG: 15 INJECTION, SOLUTION INTRAMUSCULAR; INTRAVENOUS at 20:29

## 2021-02-25 RX ADMIN — ONDANSETRON 4 MG: 2 INJECTION INTRAMUSCULAR; INTRAVENOUS at 20:32

## 2021-02-25 RX ADMIN — Medication 20 MG: at 09:09

## 2021-02-25 RX ADMIN — HYDROMORPHONE HYDROCHLORIDE 0.5 MG: 1 INJECTION, SOLUTION INTRAMUSCULAR; INTRAVENOUS; SUBCUTANEOUS at 20:53

## 2021-02-25 RX ADMIN — PROPOFOL 50 MG: 10 INJECTION, EMULSION INTRAVENOUS at 09:09

## 2021-02-25 ASSESSMENT — MIFFLIN-ST. JEOR
SCORE: 1811.88
SCORE: 1811.88

## 2021-02-25 ASSESSMENT — LIFESTYLE VARIABLES: TOBACCO_USE: 1

## 2021-02-25 NOTE — OP NOTE
[facility] Gynecology Operative Note    Pre-operative diagnosis: Blighted ovum   Post-operative diagnosis: Same   Procedure: Suction dilation and curettage   Surgeon: Marilynn Guzman MD    Anesthesia: MAC (monitored anesthesia care)   Estimated blood loss: 5 mL   Complications: None     Findings:  Normal sized, anteverted uterus. Appropriate amount products of conception    Indications: Ritika Daniel is a 20 year old  who presented with a blighted ovum, desired D&C.     Procedure: The patient was taken to the operating room where she underwent MAC anesthesia without difficulty. She was placed in the dorsal lithotomy position. An examination was done and it was noted that her uterus was mobile, anteverted, normal size. She was prepped and draped in the usual sterile fashion. A sterile speculum was inserted into the vagina.      A single tooth tenaculum was placed on that location. 10cc of 0.25% marcaine was injected at 4 o'clock and 8 o'clock to create a paracervical block.     The cervix was serially dilated to 7mm using Freda dilators. A 7mm suction curette was advanced gently to the uterine fundus. The suction device was activated and the curette rotated to clear the uterus of products of conception. Two more passes of the suction curettage were performed. A sharp curettage was performed with a gritty texture noted in the uterine cavity. There was minimal bleeding noted and the tenaculum removed with good hemostasis noted. The patient tolerated the procedure well and was taken to the recovery area in stable condition.       Marilynn Guzman MD  2021

## 2021-02-25 NOTE — ED PROVIDER NOTES
[facility]  Emergency Department Visit Note    Vaginal Bleeding (Known miscarrage)      History of Present Illness     HPI:  Ritika Daniel is a 20 year old female G1 who is scheduled for a D&C tomorrow for a missed  at 12 weeks gestation. She is presenting tonight with increased vaginal bleeding and pain. She has passed many large clots ad one picture does appear to be POC. This started 4 hours ago and is described as severe.  There is no nausea, vomiting, other vaginal discharge, dysuria, light headedness.    Medications:  Prior to Admission medications    Medication Sig Last Dose Taking? Auth Provider   Prenatal Vit-Fe Fumarate-FA (MYNATAL PLUS) TABS    Reported, Patient       Allergies:  No Known Allergies    Problem List:  Patient Active Problem List   Diagnosis     Obesity     Tinnitus, right     Miscarriage     Elevated blood pressure reading without diagnosis of hypertension     Abnormal pregnancy, unspecified trimester     Pregnant state, incidental     Prenatal care, first trimester     Vaginal discharge     Morbid obesity (H)     Encounter for general adult medical examination without abnormal findings       Past Medical History:  Past Medical History:   Diagnosis Date     Convulsions of      Mom states did see specialist, but no disorder was discovered.     Single live birth     BW 7 lb. 1 oz.,  APGAR 9/9; breast fed       Past Surgical History:  Past Surgical History:   Procedure Laterality Date     NO HISTORY OF SURGERY         Social History:  Social History     Tobacco Use     Smoking status: Passive Smoke Exposure - Never Smoker     Smokeless tobacco: Never Used   Substance Use Topics     Alcohol use: No     Alcohol/week: 0.0 standard drinks     Drug use: Unknown     Types: Other     Comment: Drug use: No       Review of Systems:  10 point review of systems obtained and pertinent positive and negative findings noted in HPI. Review of systems otherwise  "negative.      Physical Exam     Vital signs: /70   Pulse 82   Temp 98.2  F (36.8  C) (Tympanic)   Resp 18   Ht 1.575 m (5' 2\")   Wt 108.9 kg (240 lb)   LMP 2020   SpO2 98%   BMI 43.90 kg/m      Physical Exam:    General: awake and alert, comfortable but tearful  HEENT: atraumatic, no scleral injection, no nasal discharge, neck supple  Chest: clear to auscultation bilaterally without wheezes or crackles, non labored respirations  Cardiovascular: regular rate and rhythm, no murmurs or gallops  Abdomen: soft, gravid, nontender, no rebound or guarding  Extremities: no deformities, edema, or tenderness  Skin: warm, dry, no rashes  Neuro: alert and oriented x 3, moving extremities x 4, ambulates without difficulty      Medical Decision Making & ED Course     Ritika Daniel is a 20 year old female presenting with increased bleeding and passage of tissuein a known missed . Will control pain, check Hgb, and obtain US to evalaute for RPOC. It is likey she may not need D&C and will discuss results with OB/Gy on call. Rh positive  and does not require Rhogam.   ED Course as of 2207   Wed  Hemoglobin: 11.9    IMPRESSION: There is a moderate volume of fluid in the endometrial  canal towards the lower uterine segment. There is lobulated smooth  thickening of the endometrium. Echogenicity of the endometrium is  otherwise normal. Retained products of conception are not seen with  certainty but cannot be entirely excluded.       I spoke with Dr LUIS Guzman. Patient will follw up at 8am for surgery as scheduled          Follow up with obstetrics in 2-4 days for further evaluation and management. All questions were answered and the patient is comfortable with plan for discharge. The patient was discharged in stable condition.    I have reviewed the patients laboratory studies, imaging, and medical records.  .    Diagnosis & Disposition     Diagnosis:  1. Missed   "       Disposition:  Home    MD Michael Moore Theresa M, MD  02/24/21 4017

## 2021-02-25 NOTE — ED NOTES
Patient is off the floor for ultrasound.  S.O. is in attendance as well.    Ariela Cobb RN on 2/24/2021 at 8:06 PM

## 2021-02-25 NOTE — ANESTHESIA PREPROCEDURE EVALUATION
Anesthesia Pre-Procedure Evaluation    Patient: Ritika Daniel   MRN: [id number] : 2000        Preoperative Diagnosis: Miscarriage [O03.9]   Procedure : Procedure(s):  DILATION AND CURETTAGE, UTERUS, USING SUCTION     Past Medical History:   Diagnosis Date     Convulsions of      Mom states did see specialist, but no disorder was discovered.     Single live birth     BW 7 lb. 1 oz.,  APGAR 9/9; breast fed      Past Surgical History:   Procedure Laterality Date     NO HISTORY OF SURGERY        No Known Allergies   Social History     Tobacco Use     Smoking status: Passive Smoke Exposure - Never Smoker     Smokeless tobacco: Never Used   Substance Use Topics     Alcohol use: No     Alcohol/week: 0.0 standard drinks      Wt Readings from Last 1 Encounters:   21 108.9 kg (240 lb)        Anesthesia Evaluation   Pt has not had prior anesthetic         ROS/MED HX  ENT/Pulmonary: Comment: Passive smoke exposure    (+) tobacco use,     Neurologic: Comment: Tinnitus right ear      Cardiovascular:     (+) hypertension-----    METS/Exercise Tolerance:     Hematologic:  - neg hematologic  ROS     Musculoskeletal:  - neg musculoskeletal ROS     GI/Hepatic:  - neg GI/hepatic ROS     Renal/Genitourinary:  - neg Renal ROS     Endo:     (+) Obesity,     Psychiatric/Substance Use:  - neg psychiatric ROS     Infectious Disease:  - neg infectious disease ROS     Malignancy:  - neg malignancy ROS     Other:  - neg other ROS          Physical Exam    Airway        Mallampati: II   TM distance: > 3 FB   Neck ROM: full   Mouth opening: > 3 cm    Respiratory Devices and Support         Dental  no notable dental history         Cardiovascular   cardiovascular exam normal       Rhythm and rate: regular and normal     Pulmonary   pulmonary exam normal        breath sounds clear to auscultation           OUTSIDE LABS:  CBC:   Lab Results   Component Value Date    WBC 12.6 (H) 2021    HGB 11.9 2021    HCT 35.5  02/24/2021     02/24/2021     BMP: No results found for: NA, POTASSIUM, CHLORIDE, CO2, BUN, CR, GLC  COAGS: No results found for: PTT, INR, FIBR  POC: No results found for: BGM, HCG, HCGS  HEPATIC: No results found for: ALBUMIN, PROTTOTAL, ALT, AST, GGT, ALKPHOS, BILITOTAL, BILIDIRECT, KENTRELL  OTHER: No results found for: PH, LACT, A1C, MIGUEL, PHOS, MAG, LIPASE, AMYLASE, TSH, T4, T3, CRP, SED    Anesthesia Plan    ASA Status:  2   NPO Status:  NPO Appropriate    Anesthesia Type: MAC.              Consents    Anesthesia Plan(s) and associated risks, benefits, and realistic alternatives discussed. Questions answered and patient/representative(s) expressed understanding.     - Discussed with:  Patient      - Extended Intubation/Ventilatory Support Discussed: no Extended Intubation.      - Patient is DNR/DNI Status: No    Use of blood products discussed: No .     Postoperative Care       PONV prophylaxis: Ondansetron (or other 5HT-3), Dexamethasone or Solumedrol     Comments:                MIGUEL Nunn CRNA

## 2021-02-25 NOTE — ED NOTES
Patient is back from Ultrasound.  Provider was already in with patient.  Ariela Cobb RN on 2/24/2021 at 8:38 PM

## 2021-02-25 NOTE — ANESTHESIA POSTPROCEDURE EVALUATION
Patient: Ritika Daniel    Procedure(s):  DILATION AND CURETTAGE, UTERUS, USING SUCTION    Diagnosis:Miscarriage [O03.9]  Diagnosis Additional Information: No value filed.    Anesthesia Type:  MAC    Note:  Disposition: Outpatient   Postop Pain Control: Uneventful            Sign Out: Well controlled pain   PONV: No   Neuro/Psych: Uneventful            Sign Out: Acceptable/Baseline neuro status   Airway/Respiratory: Uneventful            Sign Out: Acceptable/Baseline resp. status   CV/Hemodynamics: Uneventful            Sign Out: Acceptable CV status   Other NRE: NONE   DID A NON-ROUTINE EVENT OCCUR? No         Last vitals:  Vitals:    02/25/21 1000 02/25/21 1005 02/25/21 1010   BP:   97/67   Pulse: 78  76   Resp:      Temp:   97.6  F (36.4  C)   SpO2: 97% 98% 98%       Last vitals prior to Anesthesia Care Transfer:  CRNA VITALS  2/25/2021 0900 - 2/25/2021 1000      2/25/2021             Pulse:  87    Ht Rate:  87    SpO2:  94 %    Resp Rate (set):  10          Electronically Signed By: MIGUEL Nunn CRNA  February 25, 2021  11:13 AM

## 2021-02-25 NOTE — INTERVAL H&P NOTE
History and Physical Update    The history and physical has been reviewed and the patient has been examined.  There are no interim changes to the patient's history or physical condition.    Mrailynn Guzman MD

## 2021-02-25 NOTE — ANESTHESIA CARE TRANSFER NOTE
Patient: Ritika Daniel    Procedure(s):  DILATION AND CURETTAGE, UTERUS, USING SUCTION    Diagnosis: Miscarriage [O03.9]  Diagnosis Additional Information: No value filed.    Anesthesia Type:   MAC     Note:      Level of Consciousness: awake  Oxygen Supplementation: room air    Independent Airway: airway patency satisfactory and stable    Vital Signs Stable: post-procedure vital signs reviewed and stable  Report to RN Given: handoff report given  Patient transferred to: Phase II    Handoff Report: Identifed the Patient, Identified the Reponsible Provider, Reviewed the pertinent medical history, Discussed the surgical course, Reviewed Intra-OP anesthesia mangement and issues during anesthesia, Set expectations for post-procedure period and Allowed opportunity for questions and acknowledgement of understanding      Vitals: (Last set prior to Anesthesia Care Transfer)  CRNA VITALS  2/25/2021 0900 - 2/25/2021 0931      2/25/2021             Pulse:  87    Ht Rate:  87    SpO2:  94 %    Resp Rate (set):  10        Electronically Signed By: MIGUEL LOUIS CRNA  February 25, 2021  9:31 AM

## 2021-02-25 NOTE — DISCHARGE INSTRUCTIONS
.[facility]  Adult Discharge Orders & Instructions      For 24 hours after surgery:  1. Get plenty of rest.  A responsible adult must stay with you for at least 24 hours after you leave the hospital.   2. You may feel lightheaded.  IF so, sit for a few minutes before standing.  Have someone help you get up.   3. You may have a slight fever. Call the doctor if your fever is over 101 F (38.3 C) (taken under the tongue) or lasts longer than 24 hours.  4. You may have a dry mouth, a sore throat, muscle aches or trouble sleeping.  These should go away after 24 hours.  5. Do not make important or legal decisions.  6.   Do not drive or use heavy equipment.  If you have weakness or tingling, don't drive or use heavy equipment until this feeling goes away.                                                                                                                                                                         To contact a doctor, call    [phone]______________

## 2021-02-25 NOTE — ED TRIAGE NOTES
Patient was seen yesterday in the clinic.  She is having a known miscarriage.  G1, P0.  She was 12 weeks. Patient has a D&C scheduled for tomorrow at 0900 here at [facility].  Patient is here today for increase pain and vaginal bleeding.  Patient states that she is expelling many clots.  Rates her pain 8/10  Ariela Cobb RN on 2/24/2021 at 7:23 PM        No

## 2021-02-26 NOTE — ED NOTES
Patient is comfortable since the toradol and dilaudid were administered.  Patient states that she still has pain.  Was at 10 but is now down to 7.   S.O. continues to be at bedside.    Ariela Cobb RN on 2/25/2021 at 8:47 PM

## 2021-02-26 NOTE — ED PROVIDER NOTES
[facility]  Emergency Department Visit Note    Post-op Problem      History of Present Illness     HPI:  Ritika Daniel is a 20 year old female presenting with pelvic pain after having a D&C this morning. This pain is greatest in the lower abdomen and crampy. These symptoms started upon arrival home. There is associated nausea and is not vomiting. No diarrhea or constipation. No chest pain, shortness of breath, fever, chills, dysuria, hematuria. There is no vaginal discharge or bleeding.    Medications:  Prior to Admission medications    Medication Sig Last Dose Taking? Auth Provider   ibuprofen (ADVIL/MOTRIN) 200 MG tablet Take 3 tablets (600 mg) by mouth every 8 hours as needed for pain  Yes Cris Rodriguez MD   oxyCODONE (OXY-IR) 5 MG capsule Take 1 capsule (5 mg) by mouth every 6 hours as needed for severe pain  Yes Cris Rodriguez MD   Prenatal Vit-Fe Fumarate-FA (MYNATAL PLUS) TABS    Reported, Patient       Allergies:  No Known Allergies    Problem List:  Patient Active Problem List   Diagnosis     Obesity     Tinnitus, right     Miscarriage     Elevated blood pressure reading without diagnosis of hypertension     Abnormal pregnancy, unspecified trimester     Pregnant state, incidental     Prenatal care, first trimester     Vaginal discharge     Morbid obesity (H)     Encounter for general adult medical examination without abnormal findings       Past Medical History:  Past Medical History:   Diagnosis Date     Convulsions of      Mom states did see specialist, but no disorder was discovered.     Single live birth     BW 7 lb. 1 oz.,  APGAR 9/9; breast fed       Past Surgical History:  Past Surgical History:   Procedure Laterality Date     DILATION AND CURETTAGE SUCTION N/A 2021    Procedure: DILATION AND CURETTAGE, UTERUS, USING SUCTION;  Surgeon: Marilynn Guzman MD;  Location:  OR     NO HISTORY OF SURGERY         Social History:  Social History     Tobacco  "Use     Smoking status: Passive Smoke Exposure - Never Smoker     Smokeless tobacco: Never Used   Substance Use Topics     Alcohol use: No     Alcohol/week: 0.0 standard drinks     Drug use: Unknown     Types: Other     Comment: Drug use: No       Review of Systems:  10 point review of systems obtained and pertinent positive and negative findings noted in HPI. Review of systems otherwise negative.    Physical Exam     Vital signs: /85   Pulse 99   Temp 99.6  F (37.6  C) (Tympanic)   Resp 20   Ht 1.575 m (5' 2\")   Wt 108.9 kg (240 lb)   LMP 05/03/2020 (Exact Date)   SpO2 98%   Breastfeeding Unknown   BMI 43.90 kg/m      Physical Exam:    General: awake and alert, uncomfortable  HEENT: atraumatic, no scleral injection, no nasal discharge, neck supple  Abdomen: soft, nontendeer, no rebound or guarding, nondistended  Extremities: no deformities, edema, or tenderness  Skin: warm, dry, no rashes  Neuro: alert and oriented x 3, moving extremities x 4, ambulates without difficulty      Medical Decision Making & ED Course     Ritika Daniel is a 20 year old female presenting with pelvic pain after D&C this am. She has only taken ibuprofen when she got home. No heavy bleeding, fevers, chills. Will treat her pain and check labs. Do not think imaging is warranted at this time.   ED Course as of Feb 25 2231   Thu Feb 25, 2021 2029 Hemoglobin(!): 11.3   2030 Lactic Acid: 1.5   2035 WBC(!): 17.4   2230 Pain improved will discharge with 4 tabs oxycodone for sever pain, continue Ibuporfen          I have reviewed the patients  laboratory studies and medical records.  .    Diagnosis & Disposition     Diagnosis:  1. Acute post-operative pain          Disposition:  Home    MD Michael Moore Theresa M, MD  02/25/21 2231       Cris Rodriguez MD  02/25/21 2327    "

## 2021-02-28 LAB
BACTERIA SPEC CULT: NORMAL
SPECIMEN SOURCE: NORMAL

## 2021-10-09 ENCOUNTER — HEALTH MAINTENANCE LETTER (OUTPATIENT)
Age: 21
End: 2021-10-09

## 2022-01-29 ENCOUNTER — HEALTH MAINTENANCE LETTER (OUTPATIENT)
Age: 22
End: 2022-01-29

## 2022-09-17 ENCOUNTER — HEALTH MAINTENANCE LETTER (OUTPATIENT)
Age: 22
End: 2022-09-17

## 2023-04-14 NOTE — ED TRIAGE NOTES
Patient presents to ED with pain after D&C today.  Patient stated she is unable to control her pain with OTC medications.  Patient restless, stated  Pain comes and goes and is causing nausea.  Bleeding is controlled.   
none

## 2023-05-06 ENCOUNTER — HEALTH MAINTENANCE LETTER (OUTPATIENT)
Age: 23
End: 2023-05-06

## (undated) DEVICE — PAD PERI INDIV WRAP 11" 2022A

## (undated) DEVICE — PAD CHUX UNDERPAD 30X36" P3036C

## (undated) DEVICE — SPECIMEN TRAP VACUUM SUCTION 003984-901

## (undated) DEVICE — DRSG TELFA 3X8" 1238

## (undated) DEVICE — GLOVE PROTEXIS POWDER FREE SMT 6.5  2D72PT65X

## (undated) DEVICE — COVER LIGHT HANDLE LT-F02

## (undated) DEVICE — SOL WATER 1500ML

## (undated) DEVICE — SUCTION CANNULA UTERINE 07MM CVD  21853

## (undated) DEVICE — SLEEVE COMPRESSION SCD KNEE MED 74022

## (undated) DEVICE — NDL SPINAL 20GA 3.5" 405182

## (undated) DEVICE — GLOVE PROTEXIS BLUE W/NEU-THERA 6.5  2D73EB65

## (undated) DEVICE — SYR 10ML FINGER CONTROL W/O NDL 309695

## (undated) DEVICE — PACK LITHOTOMY SBA15LIFCA

## (undated) DEVICE — TUBING SUCTION BOTTLE ASSEMBLY DISP 20714

## (undated) DEVICE — PANTIES MESH LG/XLG 2PK 706M2

## (undated) DEVICE — TUBING VACUUM COLLECTION 6FT 23116

## (undated) RX ORDER — BUPIVACAINE HYDROCHLORIDE 2.5 MG/ML
INJECTION, SOLUTION EPIDURAL; INFILTRATION; INTRACAUDAL
Status: DISPENSED
Start: 2021-02-25

## (undated) RX ORDER — KETOROLAC TROMETHAMINE 30 MG/ML
INJECTION, SOLUTION INTRAMUSCULAR; INTRAVENOUS
Status: DISPENSED
Start: 2021-02-25

## (undated) RX ORDER — LIDOCAINE HYDROCHLORIDE 20 MG/ML
INJECTION, SOLUTION EPIDURAL; INFILTRATION; INTRACAUDAL; PERINEURAL
Status: DISPENSED
Start: 2021-02-25

## (undated) RX ORDER — SODIUM CHLORIDE 9 MG/ML
INJECTION, SOLUTION INTRAVENOUS
Status: DISPENSED
Start: 2021-02-24

## (undated) RX ORDER — HYDROMORPHONE HYDROCHLORIDE 1 MG/ML
INJECTION, SOLUTION INTRAMUSCULAR; INTRAVENOUS; SUBCUTANEOUS
Status: DISPENSED
Start: 2021-02-25

## (undated) RX ORDER — OXYCODONE HYDROCHLORIDE 5 MG/1
TABLET ORAL
Status: DISPENSED
Start: 2021-02-25

## (undated) RX ORDER — ONDANSETRON 2 MG/ML
INJECTION INTRAMUSCULAR; INTRAVENOUS
Status: DISPENSED
Start: 2021-02-25

## (undated) RX ORDER — KETOROLAC TROMETHAMINE 15 MG/ML
INJECTION, SOLUTION INTRAMUSCULAR; INTRAVENOUS
Status: DISPENSED
Start: 2021-02-24

## (undated) RX ORDER — HYDROMORPHONE HYDROCHLORIDE 1 MG/ML
INJECTION, SOLUTION INTRAMUSCULAR; INTRAVENOUS; SUBCUTANEOUS
Status: DISPENSED
Start: 2021-02-24

## (undated) RX ORDER — PROPOFOL 10 MG/ML
INJECTION, EMULSION INTRAVENOUS
Status: DISPENSED
Start: 2021-02-25

## (undated) RX ORDER — SODIUM CHLORIDE 9 MG/ML
INJECTION, SOLUTION INTRAVENOUS
Status: DISPENSED
Start: 2021-02-25

## (undated) RX ORDER — KETOROLAC TROMETHAMINE 15 MG/ML
INJECTION, SOLUTION INTRAMUSCULAR; INTRAVENOUS
Status: DISPENSED
Start: 2021-02-25

## (undated) RX ORDER — DEXAMETHASONE SODIUM PHOSPHATE 4 MG/ML
INJECTION, SOLUTION INTRA-ARTICULAR; INTRALESIONAL; INTRAMUSCULAR; INTRAVENOUS; SOFT TISSUE
Status: DISPENSED
Start: 2021-02-25